# Patient Record
Sex: FEMALE | Race: WHITE | RURAL
[De-identification: names, ages, dates, MRNs, and addresses within clinical notes are randomized per-mention and may not be internally consistent; named-entity substitution may affect disease eponyms.]

---

## 2022-05-03 ENCOUNTER — DOCUMENTATION ONLY (OUTPATIENT)
Dept: INTERNAL MEDICINE CLINIC | Age: 37
End: 2022-05-03

## 2022-05-03 NOTE — PROGRESS NOTES
Pt's grandmother called trying to make appt for her but says she had diarrhea. She was told by andi to take pt to ER or urgent care to be seen for her problem then she can make a new pt appt to f/u. Grandmother asked why, Elba Simpson told her that we cannot see pt's with her symptoms in the office, especially for the first time. pts grandmother voiced understanding.

## 2022-05-18 ENCOUNTER — DOCUMENTATION ONLY (OUTPATIENT)
Dept: INTERNAL MEDICINE CLINIC | Age: 37
End: 2022-05-18

## 2022-05-18 ENCOUNTER — OFFICE VISIT (OUTPATIENT)
Dept: INTERNAL MEDICINE CLINIC | Age: 37
End: 2022-05-18
Payer: MEDICARE

## 2022-05-18 VITALS
HEART RATE: 90 BPM | TEMPERATURE: 98.3 F | WEIGHT: 224 LBS | OXYGEN SATURATION: 98 % | BODY MASS INDEX: 39.69 KG/M2 | DIASTOLIC BLOOD PRESSURE: 74 MMHG | SYSTOLIC BLOOD PRESSURE: 99 MMHG | RESPIRATION RATE: 20 BRPM | HEIGHT: 63 IN

## 2022-05-18 DIAGNOSIS — F32.A DEPRESSION, UNSPECIFIED DEPRESSION TYPE: ICD-10-CM

## 2022-05-18 DIAGNOSIS — Z13.1 SCREENING FOR DIABETES MELLITUS: ICD-10-CM

## 2022-05-18 DIAGNOSIS — R53.83 FATIGUE, UNSPECIFIED TYPE: ICD-10-CM

## 2022-05-18 DIAGNOSIS — R10.10 UPPER ABDOMINAL PAIN: Primary | ICD-10-CM

## 2022-05-18 DIAGNOSIS — R47.01 MUTISM: ICD-10-CM

## 2022-05-18 DIAGNOSIS — F31.30 BIPOLAR AFFECTIVE DISORDER, CURRENT EPISODE DEPRESSED, CURRENT EPISODE SEVERITY UNSPECIFIED (HCC): ICD-10-CM

## 2022-05-18 DIAGNOSIS — K21.9 GASTROESOPHAGEAL REFLUX DISEASE WITHOUT ESOPHAGITIS: ICD-10-CM

## 2022-05-18 PROCEDURE — 99204 OFFICE O/P NEW MOD 45 MIN: CPT | Performed by: NURSE PRACTITIONER

## 2022-05-18 RX ORDER — VENLAFAXINE HYDROCHLORIDE 150 MG/1
CAPSULE, EXTENDED RELEASE ORAL
COMMUNITY
Start: 2022-04-28

## 2022-05-18 RX ORDER — ONDANSETRON 4 MG/1
4 TABLET, FILM COATED ORAL
Qty: 21 TABLET | Refills: 0 | Status: SHIPPED | OUTPATIENT
Start: 2022-05-18 | End: 2022-05-25

## 2022-05-18 NOTE — PROGRESS NOTES
Chief Complaint   Patient presents with   [address]     just released from [facility] 4/21/2022 / patient unable to speak X 5 years - unsure why    Indigestion     burping, acid reflux, vomiting        3 most recent PHQ Screens 5/18/2022   Little interest or pleasure in doing things Nearly every day   Feeling down, depressed, irritable, or hopeless Nearly every day   Total Score PHQ 2 6   Trouble falling or staying asleep, or sleeping too much Nearly every day   Feeling tired or having little energy Nearly every day   Poor appetite, weight loss, or overeating More than half the days   Feeling bad about yourself - or that you are a failure or have let yourself or your family down Nearly every day   Trouble concentrating on things such as school, work, reading, or watching TV Nearly every day   Moving or speaking so slowly that other people could have noticed; or the opposite being so fidgety that others notice Nearly every day   Thoughts of being better off dead, or hurting yourself in some way More than half the days   PHQ 9 Score 25   How difficult have these problems made it for you to do your work, take care of your home and get along with others Very difficult       Abuse Screening Questionnaire 5/18/2022   Do you ever feel afraid of your partner? N   Are you in a relationship with someone who physically or mentally threatens you? N   Is it safe for you to go home?  Y       ADL Assessment 5/18/2022   Feeding yourself Help Needed   Getting from bed to chair No Help Needed   Getting dressed No Help Needed   Bathing or showering No Help Needed   Walk across the room (includes cane/walker) No Help Needed   Using the telphone Help Needed   Taking your medications Help Needed   Preparing meals Help Needed   Managing money (expenses/bills) Help Needed   Moderately strenuous housework (laundry) Help Needed   Shopping for personal items (toiletries/medicines) Help Needed   Shopping for groceries Help Needed Driving Help Needed   Climbing a flight of stairs Help Needed   Getting to places beyond walking distances Help Needed      name is Milena Tello - phone [phone] - she just received patients case last week, will follow up with us when she gets more information about patient history  Clarissa Tidwell LPN  5/67/2066  72:26 AM

## 2022-05-18 NOTE — PATIENT INSTRUCTIONS
It was a pleasure taking care of you. Please get labs medication and referrals. Follow up in 4 weeks.     Yari Tinsley NP

## 2022-05-19 ENCOUNTER — TELEPHONE (OUTPATIENT)
Dept: INTERNAL MEDICINE CLINIC | Age: 37
End: 2022-05-19

## 2022-05-19 NOTE — TELEPHONE ENCOUNTER
Spoke with Gray Morton guardian of patient who states patient is feeling better. Will go [facility]. ER precautions given.  Denies blood in vomitus or diarrhea

## 2022-05-19 NOTE — PROGRESS NOTES
Ciara Pa (: 1985) is a 39 y.o. female is here for evaluation of the following chief complaint(s): Establish Care (just released from [facility] 2022 / patient unable to speak X 5 years - unsure why), Indigestion (burping, acid reflux, vomiting), and Anxiety (stopped taking this medication 1 week ago due to her making her sick)        Subjective/Objective:   Jaky Trivedi was seen today for Establish Care (just released from [facility] 2022 / patient unable to speak X 5 years - unsure why), Indigestion (burping, acid reflux, vomiting), and Anxiety (stopped taking this medication 1 week ago due to her making her sick)    Patient presents to the clinic to establish care after being released from [facility] 2022. Pt reports that she was incarcerated beginning  for distribution of ativan. Pt unable to speak, unsure why. Pt able to easily understand and write. Pt writes that she saw a neurologist and thinks she may have been dx with [facility] and told 3 yers ago she might have had a stroke. Will make neurology referral. Called her , Preston Lee, and she just saw her last week for the first time trying to obtain more medical information herself. Pt is under the care of Vikki Story who is a friend that used to be a neighbor and babysit her. Only medication that she is currently on is Effexor according to Vikki Story. Pt very poor historian and neighbor does not know much of her medical or surgical hx either. Trying to get more information through [facility]. Pt denies alcohol or drug use but does smoke. Pt reports taking Zyprexa in past but gained weight on it. Pt has tried [employer] and Risperdal in past. Pt also states has hx of hernia (? Hiatal hernia) and ulcer in the past had colonoscopy and apparently an EGD. Pt states has been vomiting off and on for about a week 3 times a day on average along with diarrhea.  Reports brown in color Denies blood in diarrhea or vomitus. Pt denies chest pain sob fever. Pt reports generalized abd cramping on occasion with most in upper abd. Pt states having a lot of gas as well. ROS     Physical Exam  Vitals reviewed. Constitutional:       General: She is not in acute distress. Appearance: She is obese. She is not ill-appearing. Comments: Unable to speak. HENT:      Head: Normocephalic and atraumatic. Right Ear: Tympanic membrane, ear canal and external ear normal.      Left Ear: Tympanic membrane, ear canal and external ear normal.      Nose: Nose normal.      Mouth/Throat:      Mouth: Mucous membranes are moist.      Pharynx: Oropharynx is clear. Uvula midline. Eyes:      Extraocular Movements: Extraocular movements intact. Conjunctiva/sclera: Conjunctivae normal.   Neck:      Thyroid: No thyroid mass, thyromegaly or thyroid tenderness. Vascular: No carotid bruit or JVD. Trachea: Trachea and phonation normal.   Cardiovascular:      Rate and Rhythm: Normal rate. Pulses:           Radial pulses are 2+ on the right side and 2+ on the left side. Dorsalis pedis pulses are 2+ on the right side and 2+ on the left side. Heart sounds: Normal heart sounds, S1 normal and S2 normal.   Pulmonary:      Effort: Pulmonary effort is normal.      Breath sounds: Normal breath sounds. Abdominal:      General: Abdomen is protuberant. Bowel sounds are normal. There is distension. Palpations: There is no fluid wave, hepatomegaly, splenomegaly or pulsatile mass. Tenderness: There is generalized abdominal tenderness. There is no right CVA tenderness or left CVA tenderness. Hernia: No hernia is present. Musculoskeletal:      Right lower leg: No edema. Left lower leg: No edema. Lymphadenopathy:      Cervical: No cervical adenopathy. Skin:     General: Skin is warm and dry. Capillary Refill: Capillary refill takes less than 2 seconds.    Neurological:      General: No focal deficit present. Mental Status: She is alert and oriented to person, place, and time. Mental status is at baseline. Sensory: No sensory deficit. Motor: No weakness. Coordination: Coordination normal.      Gait: Gait normal.      Deep Tendon Reflexes: Reflexes normal.   Psychiatric:         Mood and Affect: Mood normal.         Behavior: Behavior normal.         Thought Content: Thought content normal.         Judgment: Judgment normal.          BP 99/74 (BP 1 Location: Left arm, BP Patient Position: At rest, BP Cuff Size: Large adult)   Pulse 90   Temp 98.3 °F (36.8 °C) (Oral)   Resp 20   Ht 5' 3\" (1.6 m)   Wt 224 lb (101.6 kg)   LMP 04/30/2022 (Approximate)   SpO2 98%   BMI 39.68 kg/m²      No visits with results within 6 Month(s) from this visit. Latest known visit with results is:   No results found for any previous visit. Past Surgical History:   Procedure Laterality Date    HX WRIST FRACTURE TX          Current Outpatient Medications   Medication Instructions    ondansetron hcl (ZOFRAN) 4 mg, Oral, EVERY 8 HOURS AS NEEDED    venlafaxine-SR (EFFEXOR-XR) 150 mg capsule No dose, route, or frequency recorded. Assessment/Plan:     The above diagnosis is a acute on chronic problem. We discussed expected course, resolution, and complications of diagnosis in detail. I advised her to call back or return to office if symptoms worsen/change/persist.        ICD-10-CM ICD-9-CM    1. Upper abdominal pain  B19.90 645.09 METABOLIC PANEL, COMPREHENSIVE      LIPASE      TSH 3RD GENERATION      CBC WITH AUTOMATED DIFF      MAGNESIUM      US ABD LTD      XR ABD ACUTE W 1 V CHEST      HEPATITIS C AB, RFLX TO QT BY PCR      REFERRAL TO GASTROENTEROLOGY   2. Fatigue, unspecified type  R53.83 780.79 TSH 3RD GENERATION      HEPATITIS C AB, RFLX TO QT BY PCR   3.  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 Screening for diabetes mellitus  Z13.1 V77.1    6. Gastroesophageal reflux disease without esophagitis  K21.9 530.81 REFERRAL TO GASTROENTEROLOGY   7. Depression, unspecified depression type  F32. A 311       Return in about 4 weeks (around 6/15/2022) for for follow up and recheck of labs. An electronic signature was used to authenticate this note.   -- Dina Spaulding NP

## 2022-05-20 ENCOUNTER — TELEPHONE (OUTPATIENT)
Dept: INTERNAL MEDICINE CLINIC | Age: 37
End: 2022-05-20

## 2022-05-20 NOTE — TELEPHONE ENCOUNTER
----- Message from Javid sent at 5/20/2022 10:23 AM EDT -----  Subject: Message to Provider    QUESTIONS  Information for Provider? Patient's care aid, Jose Bearden, called. She is   requesting to have the orders for test to be done at [facility]. She   wants order to be faxed to [facility] in [facility].  ---------------------------------------------------------------------------  --------------  [address]  What is the best way for the office to contact you? OK to leave message on   voicemail  Preferred Call Back Phone Number?  [phone]  ---------------------------------------------------------------------------  --------------  SCRIPT ANSWERS  undefined

## 2022-05-21 LAB
ALBUMIN SERPL-MCNC: 4.8 G/DL (ref 3.8–4.8)
ALBUMIN/GLOB SERPL: 2.4 {RATIO} (ref 1.2–2.2)
ALP SERPL-CCNC: 102 IU/L (ref 44–121)
ALT SERPL-CCNC: 13 IU/L (ref 0–32)
AST SERPL-CCNC: 7 IU/L (ref 0–40)
BASOPHILS # BLD AUTO: 0 X10E3/UL (ref 0–0.2)
BASOPHILS NFR BLD AUTO: 0 %
BILIRUB SERPL-MCNC: 0.4 MG/DL (ref 0–1.2)
BUN SERPL-MCNC: 13 MG/DL (ref 6–20)
BUN/CREAT SERPL: 19 (ref 9–23)
CALCIUM SERPL-MCNC: 9.1 MG/DL (ref 8.7–10.2)
CHLORIDE SERPL-SCNC: 102 MMOL/L (ref 96–106)
CO2 SERPL-SCNC: 19 MMOL/L (ref 20–29)
CREAT SERPL-MCNC: 0.67 MG/DL (ref 0.57–1)
EGFR: 115 ML/MIN/1.73
EOSINOPHIL # BLD AUTO: 0.1 X10E3/UL (ref 0–0.4)
EOSINOPHIL NFR BLD AUTO: 1 %
ERYTHROCYTE [DISTWIDTH] IN BLOOD BY AUTOMATED COUNT: 12.9 % (ref 11.7–15.4)
GLOBULIN SER CALC-MCNC: 2 G/DL (ref 1.5–4.5)
GLUCOSE SERPL-MCNC: 81 MG/DL (ref 65–99)
HCT VFR BLD AUTO: 47.6 % (ref 34–46.6)
HCV AB S/CO SERPL IA: <0.1 S/CO RATIO (ref 0–0.9)
HCV AB SERPL QL IA: NORMAL
HGB BLD-MCNC: 14.9 G/DL (ref 11.1–15.9)
IMM GRANULOCYTES # BLD AUTO: 0 X10E3/UL (ref 0–0.1)
IMM GRANULOCYTES NFR BLD AUTO: 0 %
LIPASE SERPL-CCNC: 23 U/L (ref 14–72)
LYMPHOCYTES # BLD AUTO: 1.8 X10E3/UL (ref 0.7–3.1)
LYMPHOCYTES NFR BLD AUTO: 21 %
MAGNESIUM SERPL-MCNC: 2.1 MG/DL (ref 1.6–2.3)
MCH RBC QN AUTO: 28.5 PG (ref 26.6–33)
MCHC RBC AUTO-ENTMCNC: 31.3 G/DL (ref 31.5–35.7)
MCV RBC AUTO: 91 FL (ref 79–97)
MONOCYTES # BLD AUTO: 0.6 X10E3/UL (ref 0.1–0.9)
MONOCYTES NFR BLD AUTO: 7 %
NEUTROPHILS # BLD AUTO: 6.4 X10E3/UL (ref 1.4–7)
NEUTROPHILS NFR BLD AUTO: 71 %
PLATELET # BLD AUTO: 213 X10E3/UL (ref 150–450)
POTASSIUM SERPL-SCNC: 4 MMOL/L (ref 3.5–5.2)
PROT SERPL-MCNC: 6.8 G/DL (ref 6–8.5)
RBC # BLD AUTO: 5.22 X10E6/UL (ref 3.77–5.28)
SODIUM SERPL-SCNC: 137 MMOL/L (ref 134–144)
TSH SERPL DL<=0.005 MIU/L-ACNC: 0.75 UIU/ML (ref 0.45–4.5)
WBC # BLD AUTO: 9 X10E3/UL (ref 3.4–10.8)

## 2022-05-24 ENCOUNTER — DOCUMENTATION ONLY (OUTPATIENT)
Dept: INTERNAL MEDICINE CLINIC | Age: 37
End: 2022-05-24

## 2022-05-24 ENCOUNTER — TELEPHONE (OUTPATIENT)
Dept: INTERNAL MEDICINE CLINIC | Age: 37
End: 2022-05-24

## 2022-05-24 NOTE — TELEPHONE ENCOUNTER
Spoke with Prerna Pandya, the guardian of the patient. She states doing much better with the burping and cramping of abd. Pt is having some behavior issues and did not take her psych meds yesterday but did today. Eren Aquino with care coordination consulted.

## 2022-05-27 ENCOUNTER — TELEPHONE (OUTPATIENT)
Dept: INTERNAL MEDICINE CLINIC | Age: 37
End: 2022-05-27

## 2022-05-27 RX ORDER — AZITHROMYCIN 250 MG/1
250 TABLET, FILM COATED ORAL SEE ADMIN INSTRUCTIONS
Qty: 6 TABLET | Refills: 0 | Status: SHIPPED | OUTPATIENT
Start: 2022-05-27

## 2022-05-31 ENCOUNTER — PATIENT OUTREACH (OUTPATIENT)
Dept: CASE MANAGEMENT | Age: 37
End: 2022-05-31

## 2022-05-31 NOTE — PROGRESS NOTES
Ambulatory Care Management Note    Date/Time:  5/31/2022 2:47 PM    This patient was received as a referral from Provider. Ambulatory Care Manager outreached to patient today to offer care management services. Introduction to self and role of care manager provided. ACM spoke w/Carissa Lara who is taking care of pt. Per Ms Reggie Lara, pt is unwilling to do anything for herself now. Pt would not leave house to get Rx's, so Ms Reggie Lara picked them up and dispensed them. During the call, ACM heard a voice in the background say, \"I'm high as hell! \"  Heike Marieeilles confirmed this was the pt speaking and that \"she smokes a lot of pot\". Heike Shruthi also reports pt spoke fluently for about 5mins yesterday.  is stating she has had enough w/caring for pt. Peytonough, \"I've done everything I can. \"   Pt was given the ultimatum of having to leave Donnavira's house tomorrow. At this time, pt has someone coming to get her this evening. [facility] instructed [facility] to call the authorities if the eviction becomes a threatening situation. [facility] asked if pt has had any SI/HI's and Fountain Shruthi reports pt just stated today she was going to kill herself b/c she has no where to go. [facility] then instructed Anusha to take pt to ED for eval.  Pt was listening in on call and shouted, \"I'm not going to any hospital!\"  Anusha thanked AC and said everything would be alright. [facility] asked Heike Hawkins if it was okay to c/b tomorrow and check on them . Ms Reggie Lara said, Errol Randolph would be very nice\".     Heike Hawkins has Ambulatory Care Manager's contact number for any questions or concerns. Martha Barksdale

## 2022-06-02 ENCOUNTER — PATIENT OUTREACH (OUTPATIENT)
Dept: CASE MANAGEMENT | Age: 37
End: 2022-06-02

## 2022-06-02 NOTE — PROGRESS NOTES
Initial Contact Social Work Note - Ambulatory  6/2/2022      Date of referral: 6/2/2022   Referral received from: Norbert Boykin RN/ACM   Reason for referral: Housing Concerns     Previous  Referral: No    If yes, brief summary and outcome:  N/A    Two Identifiers Verified: Yes    Insurance Provider: MEDICARE [facility] [place]    Support System: Srinivasa Elliott Status: N/A    Community Providers: [address] [place] [address] Provider,  Poppy Eldridge, Local [facility] , B#[phone]     ADL Assistance: N/A Patient does not use any DME. She requires prompting for bathing. Yasemin Field reports that the patient baths once every 1-2 weeks. Linda Jaramillo reports that she struggles to cook for herself, as she is unable to \"keep the pan on the burner, and makes messes that she won't clean up. \"      Housing/Living Concerns or Home Modification Needs: The patient was released from [facility] on 4/21/2022. She went to live with a family friend/former , Yasemin Field. Linda Jaramillo stated \"I'll be filing papers to evict her tomorrow, so she will need housing. \"      Transportation Concern: The patient does not drive, and does not have transportation. Medication Cost Concern: None     Medication Education or Adherence Concern: Linda Jaramillo reports that the patient struggles to take her medications properly, and likely needs assistance with medication administration. Financial Concern(s): The patient receives disability income. Income (only if applicable): The patient receives disability income. She has a representative payee through [address] [place] [address], F#[phone]. Linda Jaramillo has not been able to cash either check provided for May or June, as the checks are written out to Shukri Clements, instead of [address] [place] [address] (her legal name).   Linda Jaramillo has e-mailed and called the Payee Service, but has not had success in cashing these checks. Ability to Read/Write: Unknown     Advance Care Plan:  N/A Patient does not have an advanced medical directive, and did not express interest in completing one today. Other:   Spoke with the patient and her caregiver/friend, Anastacio Velarde today, via phone. They were in the car together, driving back from a medical appointment for [facility], and the patient spoke. [facility] assisted with answering most of the assessment questions today. Per Carissa's reports, the patient's father  from [place]'s disease, the patient's mother is alive and lives in [place], [place], and the patient's sister is also alive, but her whereabouts are unknown. The patient is estranged from her family members. Per [facility], the patient does not have a probation or . She has no other family or friends that can assist her. Identified Needs:      Housing Resources     Social Work Plan:     See goals section. Method of Communication with Provider (if appropriate): staff message     Goals Addressed                    This [address] (pt-stated)         22  Spoke with the patient and her caregiver, Anastacio Velarde, via speaker phone. They identified housing resources as the main need for the patient at this time. Provided the [facility], Z#[phone], which they called. They were given the number for the [address] [place] [address] at H#[phone], which they plan to call next. This  also mailed a copy of the [employer], to [address] [place] [address] ([address] [place] [address], [address]). Plan:  Follow up regarding the patient's housing situation in the next 1-2 weeks, and provide additional resources/guidance as able.     EPC             MAIDA Paige/KACEY, [facility]   U#[phone]

## 2022-06-02 NOTE — LETTER
June 2, 2022     Ms. [address] [facility], [address]     My name is Izaiah Hemphill. I am a  with [address] [place] [address]. I often work with patients who could benefit from additional resources in the community. We are committed to providing you excellent care. Please see the [employer] included with this letter. Please contact me at [phone] if you would like additional help with community resources. We appreciate the confidence you've shown by selecting us to provide your healthcare needs and I look forward to hearing from you soon.              Sincerely,      Danie Rene LCSW

## 2022-06-09 ENCOUNTER — PATIENT OUTREACH (OUTPATIENT)
Dept: CASE MANAGEMENT | Age: 37
End: 2022-06-09

## 2022-06-09 NOTE — PROGRESS NOTES
Ambulatory Care Social Work Note    Patient has graduated from the Complex Case Management  program on 6/9/2022. At this time all Social Work goals have been completed and the patient/family friend has the ability to self-manage. No further Social Work follow-up scheduled. Patient/family friend has Social Work contact information for further questions, concerns, or needs. Goals Addressed                    This Visit's Progress      COMPLETED: Housing Resources (pt-stated)         06/09/22  Unable to reach the patient on her cell phone; left a voicemail message. Called the patient's former caregiver, Brett Gibbscalixto, who shared that the patient has not paid her cell phone bill, and no longer has service. Jeffery Saleh shared that the patient is now staying with a male friend, whom she has known since elementary school, in a local motel. The patient's  from the community services board is following the patient closely, per Jeffery Saleh ( - [employer], Q#[phone]). Plan:  As previously noted, the [employer] has been mailed to [employer]. Advised that if Jeffery Saleh speaks with the patient again, to encourage her to continue to follow up with [facility], and to connect with [employer] as well, for additional resources. Explained that  is often aware of local housing options such as rooming houses, where individuals can rent a room. Jeffery Saleh has this 's contact information, and is aware that she can reach out if there are any further needs. No further social work outreach is planned at this time. [facility]     06/02/22  Spoke with the patient and her caregiver, Brett Heredia, via speaker phone. They identified housing resources as the main need for the patient at this time. Provided the [facility], Z#[phone], which they called. They were given the number for the [address] at V#[phone], which they plan to call next. This  also mailed a copy of the [employer], to [address] [place] [address] ([address] [place] [address], [facility], [address]). Plan:  Follow up regarding the patient's housing situation in the next 1-2 weeks, and provide additional resources/guidance as able.     EPC             Patient's upcoming visits:    Future Appointments   Date Time Provider Torri Hernandez   6/15/2022 11:00 AM MAREN Velasquez BS MAIDA Wong/KACEY, [address] Worker  F#[phone]

## 2022-06-22 ENCOUNTER — PATIENT OUTREACH (OUTPATIENT)
Dept: CASE MANAGEMENT | Age: 37
End: 2022-06-22

## 2022-06-22 NOTE — LETTER
6/23/2022 1:42 PM    Ms. Ayaka Ha  [address] [facility] [address]        Ms Gricel Dayanara,       My name is Alona Echevarria. I am a Care Manager with [address] [place] [address]. I often work with patients who could benefit from additional support understanding and managing their health. We are committed to providing you excellent care. I have been unable to reach you on this at [phone] & [phone]. Please contact me at [phone] if you would like additional help with community resources. We appreciate the confidence you've shown by selecting us to provide your healthcare needs and I look forward to hearing from you soon.       [facility],  Alona Echevarria, BSN, RN - Ambulatory - [phone]

## 2022-06-23 NOTE — PROGRESS NOTES
Ambulatory Care Management Note        Date/Time:  6/23/2022 1:38 PM    This patient was received as a referral from  Provider for case management services. Multiple unsuccessful attempts have been made to contact patient. Ambulatory Care Management get in touch letter mailed to the patient's address on file (currently, pt does not have active MyChart status) .    ACM will monitor for a response from this pt over the next 2wks.   /dla

## 2022-07-07 ENCOUNTER — PATIENT OUTREACH (OUTPATIENT)
Dept: CASE MANAGEMENT | Age: 37
End: 2022-07-07

## 2022-07-07 NOTE — PROGRESS NOTES
Ambulatory Care Management Note        Date/Time:  7/7/2022 8:22 AM    This patient was received as a referral from  Provider for case management services. Multiple unsuccessful attempts have been made to contact patient. Ambulatory Care Management get in touch letter was mailed to the patient's address on file w/no response to date . Complex case mgmt.  episode now resolved and no further outreach attempts are scheduled by AC at this time.    Andrew Berg

## 2023-11-17 ENCOUNTER — OFFICE VISIT (OUTPATIENT)
Facility: CLINIC | Age: 38
End: 2023-11-17
Payer: COMMERCIAL

## 2023-11-17 VITALS
BODY MASS INDEX: 41.64 KG/M2 | HEART RATE: 119 BPM | SYSTOLIC BLOOD PRESSURE: 110 MMHG | HEIGHT: 63 IN | WEIGHT: 235 LBS | OXYGEN SATURATION: 96 % | RESPIRATION RATE: 16 BRPM | DIASTOLIC BLOOD PRESSURE: 77 MMHG | TEMPERATURE: 97.8 F

## 2023-11-17 DIAGNOSIS — F25.9 SCHIZOAFFECTIVE DISORDER, UNSPECIFIED TYPE (HCC): ICD-10-CM

## 2023-11-17 DIAGNOSIS — Z87.09 HISTORY OF ASTHMA: ICD-10-CM

## 2023-11-17 DIAGNOSIS — F32.9 MAJOR DEPRESSIVE DISORDER, REMISSION STATUS UNSPECIFIED, UNSPECIFIED WHETHER RECURRENT: ICD-10-CM

## 2023-11-17 DIAGNOSIS — R10.10 UPPER ABDOMINAL PAIN, UNSPECIFIED: Primary | ICD-10-CM

## 2023-11-17 DIAGNOSIS — F31.9 BIPOLAR 1 DISORDER (HCC): ICD-10-CM

## 2023-11-17 PROBLEM — F20.9 SCHIZOPHRENIA (HCC): Status: RESOLVED | Noted: 2023-11-17 | Resolved: 2023-11-17

## 2023-11-17 PROBLEM — F20.9 SCHIZOPHRENIA (HCC): Status: ACTIVE | Noted: 2023-11-17

## 2023-11-17 PROCEDURE — 99214 OFFICE O/P EST MOD 30 MIN: CPT | Performed by: NURSE PRACTITIONER

## 2023-11-17 RX ORDER — OLANZAPINE 10 MG/1
10 TABLET ORAL NIGHTLY
COMMUNITY
Start: 2023-05-25

## 2023-11-17 RX ORDER — LORAZEPAM 1 MG/1
TABLET ORAL
COMMUNITY
Start: 2023-11-02

## 2023-11-17 RX ORDER — MIRTAZAPINE 45 MG/1
TABLET, FILM COATED ORAL
COMMUNITY
Start: 2023-09-17

## 2023-11-17 NOTE — PATIENT INSTRUCTIONS
It was a pleasure to see you today. 1. Upper abdominal pain, unspecified    2. Schizoaffective disorder, unspecified type ([address])    3. History of asthma    4. Bipolar 1 disorder ([address])    5. Major depressive disorder, remission status unspecified, unspecified whether recurrent       US of liver ordered and x ray    Return in about 4 weeks (around 12/15/2023). Thank you for choosing [address] [facility].     IMANI Mantilla - NP

## 2023-11-17 NOTE — PROGRESS NOTES
Chief Complaint   Patient presents with    Establish Care     Re establish care with provider    Cyst     Under left breast

## 2023-12-03 ASSESSMENT — ENCOUNTER SYMPTOMS
SHORTNESS OF BREATH: 0
COUGH: 0
DIARRHEA: 0
CONSTIPATION: 0
VOMITING: 0
NAUSEA: 0
CHEST TIGHTNESS: 0
WHEEZING: 0
ABDOMINAL PAIN: 1

## 2024-01-24 ENCOUNTER — OFFICE VISIT (OUTPATIENT)
Facility: CLINIC | Age: 39
End: 2024-01-24

## 2024-01-24 ENCOUNTER — CLINICAL DOCUMENTATION (OUTPATIENT)
Facility: CLINIC | Age: 39
End: 2024-01-24

## 2024-01-24 VITALS
SYSTOLIC BLOOD PRESSURE: 126 MMHG | RESPIRATION RATE: 20 BRPM | WEIGHT: 242 LBS | TEMPERATURE: 97.6 F | OXYGEN SATURATION: 97 % | BODY MASS INDEX: 42.88 KG/M2 | HEIGHT: 63 IN | DIASTOLIC BLOOD PRESSURE: 70 MMHG | HEART RATE: 95 BPM

## 2024-01-24 DIAGNOSIS — F31.9 BIPOLAR 1 DISORDER (HCC): ICD-10-CM

## 2024-01-24 DIAGNOSIS — F25.9 SCHIZOAFFECTIVE DISORDER, UNSPECIFIED TYPE (HCC): ICD-10-CM

## 2024-01-24 DIAGNOSIS — Z86.69: ICD-10-CM

## 2024-01-24 DIAGNOSIS — E78.5 HYPERLIPIDEMIA, UNSPECIFIED HYPERLIPIDEMIA TYPE: Primary | ICD-10-CM

## 2024-01-24 PROCEDURE — 99214 OFFICE O/P EST MOD 30 MIN: CPT | Performed by: NURSE PRACTITIONER

## 2024-01-24 RX ORDER — TRAZODONE HYDROCHLORIDE 50 MG/1
50 TABLET ORAL NIGHTLY
COMMUNITY

## 2024-01-24 RX ORDER — FLUOXETINE HYDROCHLORIDE 40 MG/1
40 CAPSULE ORAL DAILY
COMMUNITY
Start: 2024-01-09 | End: 2025-01-08

## 2024-01-24 RX ORDER — ARIPIPRAZOLE 20 MG/1
TABLET ORAL
COMMUNITY
Start: 2024-01-09 | End: 2024-02-09

## 2024-01-24 RX ORDER — ATORVASTATIN CALCIUM 40 MG/1
40 TABLET, FILM COATED ORAL DAILY
COMMUNITY
Start: 2024-01-09 | End: 2025-01-08

## 2024-01-24 NOTE — PROGRESS NOTES
Patient checked for her appt 1.24.24 -  I asked to see her Ins card to verify info, she stated she lost her card and called to get another one sent over. I told her to call us as soon as she got so we could put into her chart so she does not get a bill or billed. Patient verbalized she understood.    Thankyou

## 2024-01-24 NOTE — PATIENT INSTRUCTIONS
It was a pleasure to see you today.    1. Hyperlipidemia, unspecified hyperlipidemia type    2. Hx of King's disease    3. Schizoaffective disorder, unspecified type (HCC)    4. Bipolar 1 disorder (HCC)         Return in about 3 months (around 4/24/2024).     Thank you for choosing [facility].    IMANI Guzman NP

## 2024-01-24 NOTE — PROGRESS NOTES
Chief Complaint   Patient presents with    Headache     Left side of face is numb - mouth twists when she speaks

## 2024-01-25 LAB
CHOLEST SERPL-MCNC: 160 MG/DL (ref 100–199)
HDLC SERPL-MCNC: 59 MG/DL
IMP & REVIEW OF LAB RESULTS: NORMAL
LDLC SERPL CALC-MCNC: 83 MG/DL (ref 0–99)
SPECIMEN STATUS REPORT: NORMAL
TRIGL SERPL-MCNC: 97 MG/DL (ref 0–149)
VLDLC SERPL CALC-MCNC: 18 MG/DL (ref 5–40)

## 2024-02-03 PROBLEM — Z86.69: Status: ACTIVE | Noted: 2024-02-03

## 2024-02-03 ASSESSMENT — ENCOUNTER SYMPTOMS
SHORTNESS OF BREATH: 0
DIARRHEA: 0
CONSTIPATION: 0
ABDOMINAL PAIN: 0
CHEST TIGHTNESS: 0
WHEEZING: 0
NAUSEA: 0
COUGH: 0

## 2024-02-03 NOTE — PROGRESS NOTES
Joana Good (:  1985) is a 38 y.o. female who presents to the office today for the following:  Headache (Left side of face is numb - mouth twists when she speaks)         ASSESSMENT/PLAN:  1. Hyperlipidemia, unspecified hyperlipidemia type  -     Lipid Panel; Future  2. Hx of Swifton's disease  3. Schizoaffective disorder, unspecified type (HCC)  4. Bipolar 1 disorder (HCC)           Return in about 3 months (around 2024).         Subjective   SUBJECTIVE/OBJECTIVE:  Headache    Pt presents to the clinic for recheck. Pt has been seen 2 times in this clinic on 2022 and 2023. Neither time was patient verbal and pt just stood and rocked foot to foot. Today, pt completely verbal but mouth twisted due to difficulty with muscle movement. Pt smiling and making a joke. Pt states has been having headaches to top of head on occasion. Takes Tylenol which seems to help. Will recheck lipids. Pt has formally been dx with Swifton's     Allergies   Allergen Reactions    Codeine Other (See Comments)    Lurasidone Other (See Comments)    Risperidone Other (See Comments)    Sertraline Other (See Comments)     Current Outpatient Medications   Medication Sig Dispense Refill    ARIPiprazole (ABILIFY) 20 MG tablet Take by mouth      FLUoxetine (PROZAC) 40 MG capsule Take 1 capsule by mouth daily      atorvastatin (LIPITOR) 40 MG tablet Take 1 tablet by mouth daily      traZODone (DESYREL) 50 MG tablet Take 1 tablet by mouth nightly       No current facility-administered medications for this visit.        Review of Systems   Constitutional:  Negative for activity change, fatigue, fever and unexpected weight change.   HENT:  Negative for nosebleeds.    Eyes:  Negative for visual disturbance.   Respiratory:  Negative for cough, chest tightness, shortness of breath and wheezing.    Cardiovascular:  Negative for chest pain, palpitations and leg swelling.   Gastrointestinal:  Negative for abdominal pain, constipation,

## 2024-04-22 ENCOUNTER — OFFICE VISIT (OUTPATIENT)
Facility: CLINIC | Age: 39
End: 2024-04-22
Payer: MEDICARE

## 2024-04-22 VITALS
BODY MASS INDEX: 44.12 KG/M2 | DIASTOLIC BLOOD PRESSURE: 76 MMHG | SYSTOLIC BLOOD PRESSURE: 116 MMHG | HEART RATE: 93 BPM | TEMPERATURE: 98.4 F | OXYGEN SATURATION: 97 % | RESPIRATION RATE: 18 BRPM | WEIGHT: 249 LBS | HEIGHT: 63 IN

## 2024-04-22 DIAGNOSIS — H92.09 OTALGIA, UNSPECIFIED LATERALITY: ICD-10-CM

## 2024-04-22 DIAGNOSIS — F25.9 SCHIZOAFFECTIVE DISORDER, UNSPECIFIED TYPE (HCC): ICD-10-CM

## 2024-04-22 DIAGNOSIS — J45.21 INTERMITTENT ASTHMA WITH ACUTE EXACERBATION, UNSPECIFIED ASTHMA SEVERITY: Primary | ICD-10-CM

## 2024-04-22 DIAGNOSIS — F31.9 BIPOLAR 1 DISORDER (HCC): ICD-10-CM

## 2024-04-22 PROCEDURE — 4004F PT TOBACCO SCREEN RCVD TLK: CPT | Performed by: FAMILY MEDICINE

## 2024-04-22 PROCEDURE — G8417 CALC BMI ABV UP PARAM F/U: HCPCS | Performed by: FAMILY MEDICINE

## 2024-04-22 PROCEDURE — 99213 OFFICE O/P EST LOW 20 MIN: CPT | Performed by: FAMILY MEDICINE

## 2024-04-22 PROCEDURE — G8427 DOCREV CUR MEDS BY ELIG CLIN: HCPCS | Performed by: FAMILY MEDICINE

## 2024-04-22 RX ORDER — PREDNISONE 20 MG/1
40 TABLET ORAL DAILY
Qty: 10 TABLET | Refills: 0 | Status: SHIPPED | OUTPATIENT
Start: 2024-04-22 | End: 2024-04-27

## 2024-04-22 RX ORDER — ALBUTEROL SULFATE 90 UG/1
2 AEROSOL, METERED RESPIRATORY (INHALATION) 4 TIMES DAILY PRN
Qty: 54 G | Refills: 1 | Status: SHIPPED | OUTPATIENT
Start: 2024-04-22

## 2024-04-22 RX ORDER — AZITHROMYCIN 250 MG/1
TABLET, FILM COATED ORAL
Qty: 6 TABLET | Refills: 0 | Status: SHIPPED | OUTPATIENT
Start: 2024-04-22 | End: 2024-05-02

## 2024-04-22 SDOH — ECONOMIC STABILITY: INCOME INSECURITY: HOW HARD IS IT FOR YOU TO PAY FOR THE VERY BASICS LIKE FOOD, HOUSING, MEDICAL CARE, AND HEATING?: NOT HARD AT ALL

## 2024-04-22 SDOH — ECONOMIC STABILITY: FOOD INSECURITY: WITHIN THE PAST 12 MONTHS, YOU WORRIED THAT YOUR FOOD WOULD RUN OUT BEFORE YOU GOT MONEY TO BUY MORE.: NEVER TRUE

## 2024-04-22 SDOH — ECONOMIC STABILITY: FOOD INSECURITY: WITHIN THE PAST 12 MONTHS, THE FOOD YOU BOUGHT JUST DIDN'T LAST AND YOU DIDN'T HAVE MONEY TO GET MORE.: NEVER TRUE

## 2024-04-22 SDOH — ECONOMIC STABILITY: HOUSING INSECURITY
IN THE LAST 12 MONTHS, WAS THERE A TIME WHEN YOU DID NOT HAVE A STEADY PLACE TO SLEEP OR SLEPT IN A SHELTER (INCLUDING NOW)?: NO

## 2024-04-22 ASSESSMENT — ANXIETY QUESTIONNAIRES
IF YOU CHECKED OFF ANY PROBLEMS ON THIS QUESTIONNAIRE, HOW DIFFICULT HAVE THESE PROBLEMS MADE IT FOR YOU TO DO YOUR WORK, TAKE CARE OF THINGS AT HOME, OR GET ALONG WITH OTHER PEOPLE: NOT DIFFICULT AT ALL
1. FEELING NERVOUS, ANXIOUS, OR ON EDGE: SEVERAL DAYS
2. NOT BEING ABLE TO STOP OR CONTROL WORRYING: SEVERAL DAYS
1. FEELING NERVOUS, ANXIOUS, OR ON EDGE: SEVERAL DAYS
2. NOT BEING ABLE TO STOP OR CONTROL WORRYING: SEVERAL DAYS

## 2024-04-22 ASSESSMENT — PATIENT HEALTH QUESTIONNAIRE - PHQ9
SUM OF ALL RESPONSES TO PHQ QUESTIONS 1-9: 2
1. LITTLE INTEREST OR PLEASURE IN DOING THINGS: SEVERAL DAYS
SUM OF ALL RESPONSES TO PHQ QUESTIONS 1-9: 2
1. LITTLE INTEREST OR PLEASURE IN DOING THINGS: SEVERAL DAYS
SUM OF ALL RESPONSES TO PHQ9 QUESTIONS 1 & 2: 2
SUM OF ALL RESPONSES TO PHQ QUESTIONS 1-9: 2
2. FEELING DOWN, DEPRESSED OR HOPELESS: SEVERAL DAYS
SUM OF ALL RESPONSES TO PHQ9 QUESTIONS 1 & 2: 2
SUM OF ALL RESPONSES TO PHQ QUESTIONS 1-9: 2
2. FEELING DOWN, DEPRESSED OR HOPELESS: SEVERAL DAYS
10. IF YOU CHECKED OFF ANY PROBLEMS, HOW DIFFICULT HAVE THESE PROBLEMS MADE IT FOR YOU TO DO YOUR WORK, TAKE CARE OF THINGS AT HOME, OR GET ALONG WITH OTHER PEOPLE: NOT DIFFICULT AT ALL
SUM OF ALL RESPONSES TO PHQ QUESTIONS 1-9: 2
SUM OF ALL RESPONSES TO PHQ QUESTIONS 1-9: 2

## 2024-04-22 NOTE — PROGRESS NOTES
Chief Complaint   Patient presents with    Oral Pain     ER FU - Low L/jaw pain x 2 weeks       Patient has not been out of the country in (14 months), NO diarrhea, NO cough, NO chest conjestion, NO temp.  Pt has not been around anyone with these symptoms.     Health Maintenance reviewed.    I have reviewed the patient's medical history in detail and updated the computerized patient record.    \"Have you been to the ER, urgent care clinic since your last visit?  Yes Hospitalized since your last visit?\" No        “Have you seen or consulted any other health care providers outside of [facility] since your last visit?” no            “Have you had a pap smear?”    no    No cervical cancer screening on file                                       
93   Temp 98.4 °F (36.9 °C) (Oral)   Resp 18   Ht 1.6 m (5' 3\")   Wt 112.9 kg (249 lb)   SpO2 97%   BMI 44.11 kg/m²     Physical Exam  Eardrums are clear on both sides.  Wheezes are present, with the right lung being more affected than the left.   rrr      Assessment/Plan:        Assessment & Plan  1. Sinus and dental issues.  The patient's symptoms suggest a possible manifestation of asthma or bronchitis. Additionally, her ears are suspected to be infected. A course of Zithromax has been prescribed, with instructions for the patient to take two tablets today, followed by one tablet daily for an additional four days. Additionally, a course of prednisone has been prescribed. The patient has been advised to contact the clinic if her condition deteriorates.    Follow-up  The patient is scheduled to follow up with MsSavita Arsenio on Monday. Additionally, a Medicare wellness exam will be arranged by her primary care physician.     Diagnosis Orders   1. Intermittent asthma with acute exacerbation, unspecified asthma severity  azithromycin (ZITHROMAX) 250 MG tablet    predniSONE (DELTASONE) 20 MG tablet      2. Bipolar 1 disorder (HCC)        3. Schizoaffective disorder, unspecified type (HCC)        4. Otalgia, unspecified laterality          Current Outpatient Medications   Medication Sig Dispense Refill    azithromycin (ZITHROMAX) 250 MG tablet 500mg on day 1 followed by 250mg on days 2 - 5 6 tablet 0    predniSONE (DELTASONE) 20 MG tablet Take 2 tablets by mouth daily for 5 days 10 tablet 0    albuterol sulfate HFA (VENTOLIN HFA) 108 (90 Base) MCG/ACT inhaler Inhale 2 puffs into the lungs 4 times daily as needed for Wheezing 54 g 1    FLUoxetine (PROZAC) 40 MG capsule Take 1 capsule by mouth daily      atorvastatin (LIPITOR) 40 MG tablet Take 1 tablet by mouth daily      traZODone (DESYREL) 50 MG tablet Take 1 tablet by mouth nightly       No current facility-administered medications for this visit.       Return

## 2024-04-29 ENCOUNTER — OFFICE VISIT (OUTPATIENT)
Facility: CLINIC | Age: 39
End: 2024-04-29
Payer: MEDICARE

## 2024-04-29 VITALS
OXYGEN SATURATION: 94 % | HEIGHT: 63 IN | DIASTOLIC BLOOD PRESSURE: 74 MMHG | TEMPERATURE: 98.2 F | RESPIRATION RATE: 20 BRPM | HEART RATE: 100 BPM | BODY MASS INDEX: 45.71 KG/M2 | WEIGHT: 258 LBS | SYSTOLIC BLOOD PRESSURE: 119 MMHG

## 2024-04-29 DIAGNOSIS — M79.89 LEG SWELLING: ICD-10-CM

## 2024-04-29 DIAGNOSIS — F25.9 SCHIZOAFFECTIVE DISORDER, UNSPECIFIED TYPE (HCC): ICD-10-CM

## 2024-04-29 DIAGNOSIS — R06.02 SOB (SHORTNESS OF BREATH): Primary | ICD-10-CM

## 2024-04-29 PROCEDURE — 99214 OFFICE O/P EST MOD 30 MIN: CPT | Performed by: NURSE PRACTITIONER

## 2024-04-29 PROCEDURE — G8427 DOCREV CUR MEDS BY ELIG CLIN: HCPCS | Performed by: NURSE PRACTITIONER

## 2024-04-29 PROCEDURE — G8417 CALC BMI ABV UP PARAM F/U: HCPCS | Performed by: NURSE PRACTITIONER

## 2024-04-29 PROCEDURE — 4004F PT TOBACCO SCREEN RCVD TLK: CPT | Performed by: NURSE PRACTITIONER

## 2024-04-29 RX ORDER — AMOXICILLIN AND CLAVULANATE POTASSIUM 875; 125 MG/1; MG/1
1 TABLET, FILM COATED ORAL 2 TIMES DAILY
Qty: 20 TABLET | Refills: 0 | Status: SHIPPED | OUTPATIENT
Start: 2024-04-29 | End: 2024-05-09

## 2024-04-29 RX ORDER — FLUPHENAZINE HYDROCHLORIDE 2.5 MG/1
2.5 TABLET ORAL DAILY
COMMUNITY
Start: 2024-04-16

## 2024-04-29 RX ORDER — BUSPIRONE HYDROCHLORIDE 10 MG/1
10 TABLET ORAL 2 TIMES DAILY
COMMUNITY
Start: 2024-04-13

## 2024-04-29 RX ORDER — LANOLIN ALCOHOL/MO/W.PET/CERES
800 CREAM (GRAM) TOPICAL DAILY
COMMUNITY
Start: 2024-01-25

## 2024-04-29 NOTE — PATIENT INSTRUCTIONS
It was a pleasure to see you today.    1. SOB (shortness of breath)    2. Leg swelling    3. Schizoaffective disorder, unspecified type (HCC)    4. BMI 45.0-49.9, adult (HCC)         No follow-ups on file.     Thank you for choosing [facility].    IMANI Guzman NP

## 2024-04-29 NOTE — PROGRESS NOTES
Chief Complaint   Patient presents with    Leg Swelling     With SOB/ cough - saw Dr Murphy last week after the ER and just finished 2 weeks of Prednisone and Zithromax AB

## 2024-05-01 LAB
BASOPHILS # BLD AUTO: 0 X10E3/UL (ref 0–0.2)
BASOPHILS NFR BLD AUTO: 0 %
BUN SERPL-MCNC: 13 MG/DL (ref 6–20)
BUN/CREAT SERPL: 19 (ref 9–23)
CALCIUM SERPL-MCNC: 9.3 MG/DL (ref 8.7–10.2)
CHLORIDE SERPL-SCNC: 101 MMOL/L (ref 96–106)
CO2 SERPL-SCNC: 23 MMOL/L (ref 20–29)
CREAT SERPL-MCNC: 0.7 MG/DL (ref 0.57–1)
EGFRCR SERPLBLD CKD-EPI 2021: 113 ML/MIN/1.73
EOSINOPHIL # BLD AUTO: 0.2 X10E3/UL (ref 0–0.4)
EOSINOPHIL NFR BLD AUTO: 2 %
ERYTHROCYTE [DISTWIDTH] IN BLOOD BY AUTOMATED COUNT: 13.6 % (ref 11.7–15.4)
GLUCOSE SERPL-MCNC: 92 MG/DL (ref 70–99)
HCT VFR BLD AUTO: 41.7 % (ref 34–46.6)
HGB BLD-MCNC: 13.9 G/DL (ref 11.1–15.9)
IMM GRANULOCYTES # BLD AUTO: 0.1 X10E3/UL (ref 0–0.1)
IMM GRANULOCYTES NFR BLD AUTO: 1 %
LYMPHOCYTES # BLD AUTO: 3.2 X10E3/UL (ref 0.7–3.1)
LYMPHOCYTES NFR BLD AUTO: 28 %
MCH RBC QN AUTO: 29.2 PG (ref 26.6–33)
MCHC RBC AUTO-ENTMCNC: 33.3 G/DL (ref 31.5–35.7)
MCV RBC AUTO: 88 FL (ref 79–97)
MONOCYTES # BLD AUTO: 0.7 X10E3/UL (ref 0.1–0.9)
MONOCYTES NFR BLD AUTO: 6 %
NEUTROPHILS # BLD AUTO: 7.4 X10E3/UL (ref 1.4–7)
NEUTROPHILS NFR BLD AUTO: 63 %
PLATELET # BLD AUTO: 234 X10E3/UL (ref 150–450)
POTASSIUM SERPL-SCNC: 4.4 MMOL/L (ref 3.5–5.2)
RBC # BLD AUTO: 4.76 X10E6/UL (ref 3.77–5.28)
SODIUM SERPL-SCNC: 139 MMOL/L (ref 134–144)
WBC # BLD AUTO: 11.6 X10E3/UL (ref 3.4–10.8)

## 2024-05-21 ENCOUNTER — OFFICE VISIT (OUTPATIENT)
Facility: CLINIC | Age: 39
End: 2024-05-21
Payer: MEDICARE

## 2024-05-21 VITALS
TEMPERATURE: 97.8 F | WEIGHT: 258 LBS | RESPIRATION RATE: 20 BRPM | OXYGEN SATURATION: 95 % | BODY MASS INDEX: 45.71 KG/M2 | DIASTOLIC BLOOD PRESSURE: 77 MMHG | SYSTOLIC BLOOD PRESSURE: 113 MMHG | HEIGHT: 63 IN | HEART RATE: 90 BPM

## 2024-05-21 DIAGNOSIS — M79.89 LEG SWELLING: ICD-10-CM

## 2024-05-21 DIAGNOSIS — Z32.00 POSSIBLE PREGNANCY: ICD-10-CM

## 2024-05-21 DIAGNOSIS — R11.2 NAUSEA AND VOMITING, UNSPECIFIED VOMITING TYPE: Primary | ICD-10-CM

## 2024-05-21 PROCEDURE — G8428 CUR MEDS NOT DOCUMENT: HCPCS | Performed by: NURSE PRACTITIONER

## 2024-05-21 PROCEDURE — 99213 OFFICE O/P EST LOW 20 MIN: CPT | Performed by: NURSE PRACTITIONER

## 2024-05-21 PROCEDURE — 4004F PT TOBACCO SCREEN RCVD TLK: CPT | Performed by: NURSE PRACTITIONER

## 2024-05-21 PROCEDURE — G8417 CALC BMI ABV UP PARAM F/U: HCPCS | Performed by: NURSE PRACTITIONER

## 2024-05-21 NOTE — PROGRESS NOTES
Chief Complaint   Patient presents with    Foot Swelling     Feet swelling X 2 weeks     Nausea & Vomiting     Since last night

## 2024-05-21 NOTE — PATIENT INSTRUCTIONS
It was a pleasure to see you today.    1. Nausea and vomiting, unspecified vomiting type    2. Leg swelling       Please have x rays and labs drawn    Return for as planned on 5/30/2024.     Thank you for choosing [facility] [facility].    IMANI Guzman - NP

## 2024-05-22 LAB
ALBUMIN SERPL-MCNC: 4.2 G/DL (ref 3.9–4.9)
ALBUMIN/GLOB SERPL: 2.3 {RATIO} (ref 1.2–2.2)
ALP SERPL-CCNC: 106 IU/L (ref 44–121)
ALT SERPL-CCNC: 10 IU/L (ref 0–32)
APPEARANCE UR: CLEAR
AST SERPL-CCNC: 8 IU/L (ref 0–40)
BACTERIA #/AREA URNS HPF: ABNORMAL /[HPF]
BASOPHILS # BLD AUTO: 0 X10E3/UL (ref 0–0.2)
BASOPHILS NFR BLD AUTO: 0 %
BILIRUB SERPL-MCNC: <0.2 MG/DL (ref 0–1.2)
BILIRUB UR QL STRIP: NEGATIVE
BUN SERPL-MCNC: 16 MG/DL (ref 6–20)
BUN/CREAT SERPL: 30 (ref 9–23)
CALCIUM SERPL-MCNC: 9.4 MG/DL (ref 8.7–10.2)
CASTS URNS QL MICRO: ABNORMAL /LPF
CHLORIDE SERPL-SCNC: 103 MMOL/L (ref 96–106)
CO2 SERPL-SCNC: 22 MMOL/L (ref 20–29)
COLOR UR: YELLOW
CREAT SERPL-MCNC: 0.54 MG/DL (ref 0.57–1)
EGFRCR SERPLBLD CKD-EPI 2021: 120 ML/MIN/1.73
EOSINOPHIL # BLD AUTO: 0.1 X10E3/UL (ref 0–0.4)
EOSINOPHIL NFR BLD AUTO: 2 %
EPI CELLS #/AREA URNS HPF: >10 /HPF (ref 0–10)
ERYTHROCYTE [DISTWIDTH] IN BLOOD BY AUTOMATED COUNT: 13.3 % (ref 11.7–15.4)
GLOBULIN SER CALC-MCNC: 1.8 G/DL (ref 1.5–4.5)
GLUCOSE SERPL-MCNC: 115 MG/DL (ref 70–99)
GLUCOSE UR QL STRIP: NEGATIVE
HCT VFR BLD AUTO: 36.6 % (ref 34–46.6)
HGB BLD-MCNC: 12.1 G/DL (ref 11.1–15.9)
HGB UR QL STRIP: ABNORMAL
IMM GRANULOCYTES # BLD AUTO: 0 X10E3/UL (ref 0–0.1)
IMM GRANULOCYTES NFR BLD AUTO: 1 %
KETONES UR QL STRIP: NEGATIVE
LEUKOCYTE ESTERASE UR QL STRIP: ABNORMAL
LIPASE SERPL-CCNC: 20 U/L (ref 14–72)
LYMPHOCYTES # BLD AUTO: 2.3 X10E3/UL (ref 0.7–3.1)
LYMPHOCYTES NFR BLD AUTO: 26 %
MCH RBC QN AUTO: 29.3 PG (ref 26.6–33)
MCHC RBC AUTO-ENTMCNC: 33.1 G/DL (ref 31.5–35.7)
MCV RBC AUTO: 89 FL (ref 79–97)
MICRO URNS: ABNORMAL
MONOCYTES # BLD AUTO: 0.6 X10E3/UL (ref 0.1–0.9)
MONOCYTES NFR BLD AUTO: 6 %
NEUTROPHILS # BLD AUTO: 5.8 X10E3/UL (ref 1.4–7)
NEUTROPHILS NFR BLD AUTO: 65 %
NITRITE UR QL STRIP: NEGATIVE
PH UR STRIP: 6 [PH] (ref 5–7.5)
PLATELET # BLD AUTO: 242 X10E3/UL (ref 150–450)
POTASSIUM SERPL-SCNC: 4.1 MMOL/L (ref 3.5–5.2)
PROT SERPL-MCNC: 6 G/DL (ref 6–8.5)
PROT UR QL STRIP: ABNORMAL
RBC # BLD AUTO: 4.13 X10E6/UL (ref 3.77–5.28)
RBC #/AREA URNS HPF: ABNORMAL /HPF (ref 0–2)
SODIUM SERPL-SCNC: 140 MMOL/L (ref 134–144)
SP GR UR STRIP: >=1.03 (ref 1–1.03)
UROBILINOGEN UR STRIP-MCNC: 0.2 MG/DL (ref 0.2–1)
WBC # BLD AUTO: 8.8 X10E3/UL (ref 3.4–10.8)
WBC #/AREA URNS HPF: ABNORMAL /HPF (ref 0–5)

## 2024-05-27 ASSESSMENT — ENCOUNTER SYMPTOMS
CONSTIPATION: 0
CHEST TIGHTNESS: 0
COUGH: 0
ABDOMINAL DISTENTION: 0
VOMITING: 1
NAUSEA: 1
SHORTNESS OF BREATH: 0
DIARRHEA: 0
ABDOMINAL PAIN: 0
WHEEZING: 0

## 2024-05-28 ENCOUNTER — OFFICE VISIT (OUTPATIENT)
Facility: CLINIC | Age: 39
End: 2024-05-28
Payer: MEDICARE

## 2024-05-28 VITALS
BODY MASS INDEX: 44.83 KG/M2 | TEMPERATURE: 98.6 F | HEIGHT: 63 IN | WEIGHT: 253 LBS | OXYGEN SATURATION: 96 % | HEART RATE: 80 BPM | SYSTOLIC BLOOD PRESSURE: 131 MMHG | DIASTOLIC BLOOD PRESSURE: 76 MMHG | RESPIRATION RATE: 20 BRPM
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
## 2024-05-28 DIAGNOSIS — F31.9 BIPOLAR 1 DISORDER (HCC): ICD-10-CM

## 2024-05-28 DIAGNOSIS — M79.89 LEG SWELLING: Primary | ICD-10-CM

## 2024-05-28 DIAGNOSIS — F25.9 SCHIZOAFFECTIVE DISORDER, UNSPECIFIED TYPE (HCC): ICD-10-CM

## 2024-05-28 PROCEDURE — G8427 DOCREV CUR MEDS BY ELIG CLIN: HCPCS | Performed by: NURSE PRACTITIONER

## 2024-05-28 PROCEDURE — 4004F PT TOBACCO SCREEN RCVD TLK: CPT | Performed by: NURSE PRACTITIONER

## 2024-05-28 PROCEDURE — G8417 CALC BMI ABV UP PARAM F/U: HCPCS | Performed by: NURSE PRACTITIONER

## 2024-05-28 PROCEDURE — 99214 OFFICE O/P EST MOD 30 MIN: CPT | Performed by: NURSE PRACTITIONER

## 2024-05-28 RX ORDER — DOXYCYCLINE HYCLATE 100 MG/1
200 CAPSULE ORAL ONCE
Qty: 2 CAPSULE | Refills: 0 | Status: SHIPPED | OUTPATIENT
Start: 2024-05-28 | End: 2024-05-28

## 2024-05-28 NOTE — PROGRESS NOTES
Chief Complaint   Patient presents with    Foot Swelling    Insect Bite     Tick bites     Patient states that she stopped taking her meds for bipolar 1 week ago because she felt bad when taking them - now with a tremor  Jade Tubbs LPN 5/28/2024 4:38 PM

## 2024-05-28 NOTE — PATIENT INSTRUCTIONS
It was a pleasure to see you today.    1. Leg swelling    2. Bipolar 1 disorder (HCC)    3. Schizoaffective disorder, unspecified type (HCC)    4. Tick bite, unspecified site, initial encounter         No follow-ups on file.     Thank you for choosing [facility].    IMANI Guzman NP

## 2024-05-28 NOTE — PROGRESS NOTES
Joana Good (:  1985) is a 38 y.o. female who presents to the office today for the following:  Foot Swelling (Feet swelling X 2 weeks ) and Nausea & Vomiting (Since last night)      Assessment & Plan   ASSESSMENT/PLAN:  1. Nausea and vomiting, unspecified vomiting type  -     Urinalysis; Future  -     Comprehensive Metabolic Panel; Future  -     CBC with Auto Differential; Future  -     Lipase; Future  -     XR ABDOMEN (KUB) (SINGLE AP VIEW); Future  2. Leg swelling  3. Possible pregnancy  -     AMB POC URINE PREGNANCY TEST, VISUAL COLOR COMPARISON           Return for as planned on 2024.         Subjective   SUBJECTIVE/OBJECTIVE:  Foot Swelling   Pertinent negatives include no fever or numbness.   Nausea & Vomiting  Associated symptoms include nausea and vomiting. Pertinent negatives include no abdominal pain, chest pain, coughing, fatigue, fever, headaches, joint swelling, myalgias, numbness, rash or weakness.   Pt presents to the clinic for lower leg swelling gradually increasing over 2 weeks. Pt reports n/v since last night. Vomitus non bloody non bilious denies fever. Will order labs and KUB.  Allergies   Allergen Reactions   • Codeine Other (See Comments)   • Lurasidone Other (See Comments)   • Risperidone Other (See Comments)   • Sertraline Other (See Comments)     Current Outpatient Medications   Medication Sig Dispense Refill   • ARIPiprazole ER (ABILIFY MAINTENA) 400 MG PRSY Inject 400 mg into the muscle     • busPIRone (BUSPAR) 10 MG tablet Take 1 tablet by mouth 2 times daily     • vitamin D (CHOLECALCIFEROL) 25 MCG (1000 UT) TABS tablet Take 25 mcg by mouth daily     • fluPHENAZine HCl (PROLIXIN) 2.5 MG tablet Take 1 tablet by mouth daily     • magnesium oxide (MAG-OX) 400 (240 Mg) MG tablet Take 2 tablets by mouth daily     • SPRINTEC 28 0.25-35 MG-MCG per tablet Take 1 tablet by mouth daily     • albuterol sulfate HFA (VENTOLIN HFA) 108 (90 Base) MCG/ACT inhaler Inhale 2 puffs into

## 2024-05-30 ENCOUNTER — TELEPHONE (OUTPATIENT)
Facility: CLINIC | Age: 39
End: 2024-05-30

## 2024-05-30 NOTE — TELEPHONE ENCOUNTER
Jason Ruht (em contact) calling to let Bernadette know that pt is wiping blood on both ends when using the bathroom, no burning when urinating, no fever, but is saying her stomach hurts. Pt not speaking but Jason says she is nodding her head yes or no. Please call Jason at [phone]

## 2024-05-30 NOTE — TELEPHONE ENCOUNTER
Called and spoke with Marycarmen Ruth and patient who was present during the phone call - patient c/o abdominal pain and bleeding from vaginal area and rectum area - menstrual cycle due next week - advised patient she needs to go to the ER to be evaluated and for a workup of why she is bleeding - per JORGE Ruth patient was not verbal but was shaking her head side to side gesturing that she did not want to go to the ER - I spoke with patient and explained to her how it was important that she go check this problem out - menstruation could cause vaginal bleeding but not from the rectum - Ms Ruth states that she will try to get the patient to go, but she was not sure if she could or not  Jade Tubbs LPN 5/30/2024 4:16 PM

## 2024-07-02 ENCOUNTER — OFFICE VISIT (OUTPATIENT)
Facility: CLINIC | Age: 39
End: 2024-07-02
Payer: MEDICARE

## 2024-07-02 VITALS
OXYGEN SATURATION: 97 % | TEMPERATURE: 99.1 F | RESPIRATION RATE: 20 BRPM | BODY MASS INDEX: 45 KG/M2 | SYSTOLIC BLOOD PRESSURE: 118 MMHG | WEIGHT: 254 LBS | HEIGHT: 63 IN | DIASTOLIC BLOOD PRESSURE: 70 MMHG | HEART RATE: 89 BPM

## 2024-07-02 DIAGNOSIS — Z86.69: ICD-10-CM

## 2024-07-02 DIAGNOSIS — F31.9 BIPOLAR 1 DISORDER (HCC): ICD-10-CM

## 2024-07-02 DIAGNOSIS — F25.9 SCHIZOAFFECTIVE DISORDER, UNSPECIFIED TYPE (HCC): ICD-10-CM

## 2024-07-02 DIAGNOSIS — R82.90 ABNORMAL FINDING ON URINALYSIS: Primary | ICD-10-CM

## 2024-07-02 PROCEDURE — 4004F PT TOBACCO SCREEN RCVD TLK: CPT | Performed by: NURSE PRACTITIONER

## 2024-07-02 PROCEDURE — G8417 CALC BMI ABV UP PARAM F/U: HCPCS | Performed by: NURSE PRACTITIONER

## 2024-07-02 PROCEDURE — G8427 DOCREV CUR MEDS BY ELIG CLIN: HCPCS | Performed by: NURSE PRACTITIONER

## 2024-07-02 PROCEDURE — 99213 OFFICE O/P EST LOW 20 MIN: CPT | Performed by: NURSE PRACTITIONER

## 2024-07-02 NOTE — PATIENT INSTRUCTIONS
It was a pleasure to see you today.    1. Abnormal finding on urinalysis    2. Schizoaffective disorder, unspecified type (HCC)    3. Hx of Ward's disease    4. BMI 45.0-49.9, adult (HCC)    5. Bipolar 1 disorder (HCC)         Return for make an appt with Dr Murphy.     Thank you for choosing [facility].    IMANI Guzman NP

## 2024-07-03 DIAGNOSIS — R82.90 ABNORMAL FINDING ON URINALYSIS: ICD-10-CM

## 2024-07-03 LAB
BACTERIA SPEC CULT: NORMAL
CC UR VC: NORMAL
SERVICE CMNT-IMP: NORMAL

## 2025-03-06 ENCOUNTER — HOSPITAL ENCOUNTER (OUTPATIENT)
Facility: HOSPITAL | Age: 40
Discharge: HOME | End: 2025-03-06
Attending: STUDENT IN AN ORGANIZED HEALTH CARE EDUCATION/TRAINING PROGRAM | Admitting: OBSTETRICS & GYNECOLOGY
Payer: MEDICARE

## 2025-03-06 VITALS
HEART RATE: 101 BPM | OXYGEN SATURATION: 94 % | DIASTOLIC BLOOD PRESSURE: 79 MMHG | SYSTOLIC BLOOD PRESSURE: 137 MMHG | TEMPERATURE: 97.5 F | RESPIRATION RATE: 16 BRPM

## 2025-03-06 DIAGNOSIS — O47.9 UTERINE CONTRACTIONS AT GREATER THAN 20 WEEKS OF GESTATION: Primary | ICD-10-CM

## 2025-03-06 LAB
APPEARANCE UR: ABNORMAL
BACTERIA URNS QL MICRO: ABNORMAL /HPF
BASOPHILS # BLD: 0 K/UL (ref 0–0.1)
BASOPHILS NFR BLD: 0 % (ref 0–1)
BILIRUB UR QL: NEGATIVE
COLOR UR: ABNORMAL
DIFFERENTIAL METHOD BLD: ABNORMAL
EOSINOPHIL # BLD: 0.26 K/UL (ref 0–0.4)
EOSINOPHIL NFR BLD: 2 % (ref 0–7)
EPITH CASTS URNS QL MICRO: ABNORMAL /LPF
ERYTHROCYTE [DISTWIDTH] IN BLOOD BY AUTOMATED COUNT: 14.3 % (ref 11.5–14.5)
GLUCOSE UR STRIP.AUTO-MCNC: NEGATIVE MG/DL
HCT VFR BLD AUTO: 33.1 % (ref 35–47)
HGB BLD-MCNC: 11.2 G/DL (ref 11.5–16)
HGB UR QL STRIP: NEGATIVE
IMM GRANULOCYTES # BLD AUTO: 0 K/UL (ref 0–0.04)
IMM GRANULOCYTES NFR BLD AUTO: 0 % (ref 0–0.5)
KETONES UR QL STRIP.AUTO: ABNORMAL MG/DL
LEUKOCYTE ESTERASE UR QL STRIP.AUTO: ABNORMAL
LYMPHOCYTES # BLD: 2.11 K/UL (ref 0.8–3.5)
LYMPHOCYTES NFR BLD: 16 % (ref 12–49)
MCH RBC QN AUTO: 30.5 PG (ref 26–34)
MCHC RBC AUTO-ENTMCNC: 33.8 G/DL (ref 30–36.5)
MCV RBC AUTO: 90.2 FL (ref 80–99)
METAMYELOCYTES NFR BLD MANUAL: 2 %
MONOCYTES # BLD: 0.92 K/UL (ref 0–1)
MONOCYTES NFR BLD: 7 % (ref 5–13)
MYELOCYTES NFR BLD MANUAL: 1 %
NEUTS SEG # BLD: 9.5 K/UL (ref 1.8–8)
NEUTS SEG NFR BLD: 72 % (ref 32–75)
NITRITE UR QL STRIP.AUTO: NEGATIVE
NRBC # BLD: 0 K/UL (ref 0–0.01)
NRBC BLD-RTO: 0 PER 100 WBC
PH UR STRIP: 5.5 (ref 5–8)
PLATELET # BLD AUTO: 160 K/UL (ref 150–400)
PMV BLD AUTO: 9.5 FL (ref 8.9–12.9)
PROT UR STRIP-MCNC: ABNORMAL MG/DL
RBC # BLD AUTO: 3.67 M/UL (ref 3.8–5.2)
RBC #/AREA URNS HPF: ABNORMAL /HPF (ref 0–5)
RBC MORPH BLD: ABNORMAL
SP GR UR REFRACTOMETRY: 1.02
URINE CULTURE IF INDICATED: ABNORMAL
UROBILINOGEN UR QL STRIP.AUTO: 0.2 EU/DL (ref 0.2–1)
WBC # BLD AUTO: 13.2 K/UL (ref 3.6–11)
WBC URNS QL MICRO: ABNORMAL /HPF (ref 0–4)

## 2025-03-06 PROCEDURE — 6370000000 HC RX 637 (ALT 250 FOR IP): Performed by: OBSTETRICS & GYNECOLOGY

## 2025-03-06 PROCEDURE — 85025 COMPLETE CBC W/AUTO DIFF WBC: CPT

## 2025-03-06 PROCEDURE — 87186 SC STD MICRODIL/AGAR DIL: CPT

## 2025-03-06 PROCEDURE — 2580000003 HC RX 258: Performed by: OBSTETRICS & GYNECOLOGY

## 2025-03-06 PROCEDURE — 4500000002 HC ER NO CHARGE

## 2025-03-06 PROCEDURE — 87088 URINE BACTERIA CULTURE: CPT

## 2025-03-06 PROCEDURE — 87086 URINE CULTURE/COLONY COUNT: CPT

## 2025-03-06 PROCEDURE — 81001 URINALYSIS AUTO W/SCOPE: CPT

## 2025-03-06 PROCEDURE — 99212 OFFICE O/P EST SF 10 MIN: CPT

## 2025-03-06 PROCEDURE — 36415 COLL VENOUS BLD VENIPUNCTURE: CPT

## 2025-03-06 RX ORDER — GRANULES FOR ORAL 3 G/1
3 POWDER ORAL ONCE
Status: COMPLETED | OUTPATIENT
Start: 2025-03-06 | End: 2025-03-06

## 2025-03-06 RX ORDER — SODIUM CHLORIDE, SODIUM LACTATE, POTASSIUM CHLORIDE, AND CALCIUM CHLORIDE .6; .31; .03; .02 G/100ML; G/100ML; G/100ML; G/100ML
1000 INJECTION, SOLUTION INTRAVENOUS ONCE
Status: COMPLETED | OUTPATIENT
Start: 2025-03-06 | End: 2025-03-06

## 2025-03-06 RX ADMIN — SODIUM CHLORIDE, SODIUM LACTATE, POTASSIUM CHLORIDE, AND CALCIUM CHLORIDE 1000 ML: .6; .31; .03; .02 INJECTION, SOLUTION INTRAVENOUS at 13:48

## 2025-03-06 RX ADMIN — GRANULES FOR ORAL SOLUTION 1 PACKET: 3 POWDER ORAL at 15:26

## 2025-03-06 NOTE — ED PROVIDER NOTES
Brief Medical Screening Examination for OB patient at triage    HPI:39yoF 32 weeks pregnant with third complains of contractions.  They started this morning, she describes them in her cervix, every 5 minutes.  Otherwise feels ok    ROS: positive contractions, lower abdominal cramping,  denies vaginal bleeding    Vitals: Patient Vitals for the past 4 hrs:   Temp Pulse Resp BP SpO2   03/06/25 1336 -- (!) 101 -- 137/79 94 %   03/06/25 1321 -- (!) 105 -- 130/80 97 %   03/06/25 1316 -- 100 -- -- 97 %   03/06/25 1306 97.5 °F (36.4 °C) (!) 102 16 133/80 96 %   03/06/25 1300 -- (!) 106 -- -- 97 %         Physical Exam:  General appearance: No apparent distress.  Stable vitals.  Afebrile.  Skin exam: Warm and dry.  No pallor.  Neurologic: Alert and oriented.contractures, slurred speech, chronic  Abdominal exam: Soft, nontender.  Gravid uterus.    Differential diagnosis: Labor, premature labor, threatened miscarriage, [place] contractions, uterine contractions in pregnancy, premature rupture of membranes.    This patient with a primary obstetrical chief complaint is stable and medically cleared for direct transfer to the OB Labor & Delivery unit for further monitoring and workup.  Medical screening exam is complete.    MD Max Preston Jeffrey M, MD  03/06/25 [id number]

## 2025-03-06 NOTE — H&P
Department of Obstetrics and Gynecology  Attending Obstetrics History and Physical        CHIEF COMPLAINT:  Pelvic pain with movement    HISTORY OF PRESENT ILLNESS:      The patient is a 39 y.o.  3 parity 1 at 31 and 4/7 weeks.  Patient presents c/o sharp pelvic pain in bilateral Lqs, R>L, and increased significantly by position change, movement, walking. Improved with rest. Denies abdominal tightening/contractions, ROM, or vaginal bleeding. She notes good FM. [facility] EMR reviewed from 24- 3/6/25. ANC C/B CHTN on no meds, [place], multiple psychiatric disorders, and currently incarcerated.    DATES:      Estimated Due Date:  25    PRENATAL CARE:    Provider:  [facility]    Blood Type/Rh:  O+  Antibody Screen:  Neg  Rubella: Non-Immune  RPR:  Neg  Hepatitis B Surface Antigen: Neg  HIV:  Neg  Gonorrhea:  Neg  Chlamydia:  Neg  U/S Structural Survery:  Normal  1 hour Glucose Tolerance Test:  No result  Group B Strep:  NA      PAST OB HISTORY        Depression:  Yes       Post-partum depression:  No      Diabetes:  No      Gestational Diabetes:  No      Thyroid Disease:  No      Chronic HTN:  Yes No meds      Gestation HTN:  No      Pre-eclampsia:  No      Seizure disorder:  No      Asthma:  No      Clotting disorder:  No      :  No      Tubal ligation:  No      D & C:  No      Cerclage:  No      LEEP:  No      Myomectomy:  No    OB History    Para Term  AB Living   3 1 1   1 1   SAB IAB Ectopic Molar Multiple Live Births   0 1       1      # Outcome Date GA Lbr Angel/2nd Weight Sex Type Anes PTL Lv   3 Current            2 Term 23 37w1d 21:58 / 01:51 3.43 kg (7 lb 9 oz) F Vag-Spont EPI N ALEXANDRO   1 IAB 00               Past Medical History:        Diagnosis Date    Anxiety     Asthma     Preble's disease (HCC)     Hypertension      Past Surgical History:        Procedure Laterality Date    WRIST FRACTURE SURGERY       Social History:    Cognitive and speech issues

## 2025-03-06 NOTE — PROGRESS NOTES
NST:    Baseline: 130    Variability: Moderate    Accelerations: Two 15 x 15 accels in a ten minute window    Decelerations: None    Contractions: None    Cat 1, RNST    This test was preformed under my supervision and interpreted by me.    Cr Moctezuma M.D.

## 2025-03-06 NOTE — PROGRESS NOTES
1238: Joana Good is a 39 y.o.  at 31w4d patient of PRUDENCE Malloy CNM at [facility] who presents to L&D triage with c/o abdominal pain. She reports Positive FM, denies vaginal bleeding and LOF. She also denies Headaches, Scotoma, RUQ pain, and Edema. Urine sample obtained. EFM and toco placed for initial assessment.    1349: Dr. Moctezuma at bedside to eval pt, SVE performed with pt permission pt cervix closed.    1515: Dr. Moctezuma at bedside, pt to be d/c.    1542: Pt discharged with  and security. Pt remains pregnant and in no visible distress.

## 2025-03-08 LAB
BACTERIA SPEC CULT: ABNORMAL
BACTERIA SPEC CULT: ABNORMAL
CC UR VC: ABNORMAL
SERVICE CMNT-IMP: ABNORMAL